# Patient Record
Sex: MALE | Race: WHITE | NOT HISPANIC OR LATINO
[De-identification: names, ages, dates, MRNs, and addresses within clinical notes are randomized per-mention and may not be internally consistent; named-entity substitution may affect disease eponyms.]

---

## 2018-09-02 ENCOUNTER — TRANSCRIPTION ENCOUNTER (OUTPATIENT)
Age: 63
End: 2018-09-02

## 2022-11-09 ENCOUNTER — APPOINTMENT (OUTPATIENT)
Dept: NEUROLOGY | Facility: CLINIC | Age: 67
End: 2022-11-09

## 2022-11-09 VITALS
BODY MASS INDEX: 25.11 KG/M2 | TEMPERATURE: 97.9 F | HEART RATE: 61 BPM | OXYGEN SATURATION: 98 % | DIASTOLIC BLOOD PRESSURE: 85 MMHG | SYSTOLIC BLOOD PRESSURE: 144 MMHG | WEIGHT: 160 LBS | HEIGHT: 67 IN

## 2022-11-09 PROCEDURE — 99205 OFFICE O/P NEW HI 60 MIN: CPT

## 2023-01-10 ENCOUNTER — APPOINTMENT (OUTPATIENT)
Dept: NUCLEAR MEDICINE | Facility: HOSPITAL | Age: 68
End: 2023-01-10

## 2023-01-31 ENCOUNTER — APPOINTMENT (OUTPATIENT)
Dept: NEUROLOGY | Facility: CLINIC | Age: 68
End: 2023-01-31
Payer: MEDICARE

## 2023-01-31 VITALS
OXYGEN SATURATION: 97 % | HEART RATE: 60 BPM | SYSTOLIC BLOOD PRESSURE: 130 MMHG | HEIGHT: 67 IN | DIASTOLIC BLOOD PRESSURE: 85 MMHG | BODY MASS INDEX: 24.8 KG/M2 | TEMPERATURE: 97.2 F | WEIGHT: 158 LBS

## 2023-01-31 VITALS — HEART RATE: 68 BPM | SYSTOLIC BLOOD PRESSURE: 129 MMHG | DIASTOLIC BLOOD PRESSURE: 78 MMHG

## 2023-01-31 PROCEDURE — 99215 OFFICE O/P EST HI 40 MIN: CPT

## 2023-03-07 ENCOUNTER — APPOINTMENT (OUTPATIENT)
Dept: NUCLEAR MEDICINE | Facility: HOSPITAL | Age: 68
End: 2023-03-07

## 2023-03-07 ENCOUNTER — OUTPATIENT (OUTPATIENT)
Dept: OUTPATIENT SERVICES | Facility: HOSPITAL | Age: 68
LOS: 1 days | End: 2023-03-07
Payer: MEDICARE

## 2023-03-07 PROCEDURE — A9584: CPT

## 2023-03-07 PROCEDURE — 78803 RP LOCLZJ TUM SPECT 1 AREA: CPT

## 2023-03-07 PROCEDURE — 78803 RP LOCLZJ TUM SPECT 1 AREA: CPT | Mod: 26,MH

## 2023-03-10 ENCOUNTER — APPOINTMENT (OUTPATIENT)
Dept: NEUROLOGY | Facility: CLINIC | Age: 68
End: 2023-03-10
Payer: MEDICARE

## 2023-03-10 VITALS — DIASTOLIC BLOOD PRESSURE: 83 MMHG | SYSTOLIC BLOOD PRESSURE: 147 MMHG | HEART RATE: 67 BPM

## 2023-03-10 VITALS
WEIGHT: 162 LBS | DIASTOLIC BLOOD PRESSURE: 86 MMHG | OXYGEN SATURATION: 98 % | SYSTOLIC BLOOD PRESSURE: 131 MMHG | BODY MASS INDEX: 25.43 KG/M2 | HEIGHT: 67 IN | HEART RATE: 58 BPM | TEMPERATURE: 97.4 F

## 2023-03-10 PROCEDURE — 99215 OFFICE O/P EST HI 40 MIN: CPT

## 2023-03-10 NOTE — HISTORY OF PRESENT ILLNESS
[FreeTextEntry1] : 67 year RH male patient who was diagnosed with Parkinson's Disease in early 2022. He first started noticing symptoms with a 6 month history of changes in handwriting. Pt had a brain MRI on 5/10/22 which revealed mild cerebral volume loss, and mild/moderate inflammatory changes in para nasal sinuses.\par \par TESSIE sacn showed abnormal scan with significantly severe Left more than right consistent with clinical diagnosis fo PD. \par \par Pt reports still functioning, still able to dress and balance is good.\par - Not feeling as strong as he was but still still running an employment agency, back slightly stiffer. \par - walking 45min/ day, strength and conditioning with a  3x/week. \par - sleep is poor. wakes up 1-2hrs after falling asleep. Doesn’t feel comfortable bed, difficult to turn in bed, watches [employer], eats an apple. Needs to be comfortable. less acting out dreams. occasional urination. \par - memory not sot as sharp as when was younger. \par \par Current Medications\par Melatonin 5 mg QHS\par Simvastatin 20 mg\par Ezetimibe 10m\par miralax

## 2023-03-10 NOTE — PHYSICAL EXAM
[FreeTextEntry1] : Patient with normal cognitive function, mild hypomimia with diminished blinking, mild reduction of voice volume but no significant dysarthria.\par There is mild rigidity with some cogwheeling, present bilaterally, more evident on the right side mild to moderate and mild on the left\par no resting tremor today\par There is mild loss of motor dexterity, with decrement at rapid sequential and rapid alternating movements, also slightly more evident on the right side.\par Foot tapping is slightly impaired, also with the right side more affected.\par Posture is mildly stooped, with no slight shortening of gait stride and mild asymmetry with left reduction, and decrease arm swing, more evident on the left side. No trouble standing from a seated position with arms crossed. No trouble with turns, no freezing of gait. \par Postural reflexes are normal.\par

## 2023-03-10 NOTE — ASSESSMENT
[FreeTextEntry1] : 67 year RH male patient who was diagnosed with Parkinson's Disease in early 2022. He first started noticing symptoms with a 6 month history of changes in handwriting. Pt had a brain MRI on 5/10/22 which revealed mild cerebral volume loss, and mild/moderate inflammatory changes in para nasal sinuses. TESSIE scan abnormal. \par \par Overall, no significant changes since the last visit. Feels slightly weaker specially at night with insomnia secondary to trouble finding a comfortable position in bed. He ambulates independently with no history of falls. He is exercising 3x / week with a  and walking 45min daily. RBD symptoms have improved with melatonin significantly. Constipation is improved with miralax. His exam appears stable with rigidity and bradykinesias R>L which is mostly mild. \par \par Plan:\par - Continue Melatonin OTC 5 mg QHS for RBD related symptoms. May increase to max of 10mg qhs. - In the future, consider Clonazepam if Melatonin does not improve sleep\par - Discussed the importance of exercise and walking daily to slow the progression of the disease\par - Start Azilect 0.5mg for 1 month and increase to 1mg qd in 1 month given some evidence of slowing down disease progression \par \par Encouraged to increase exercise and physical activity and maintain an active social and intellectual life.\par \par More than 50% of the visit were dedicated to review of treatment, therapeutic plan, and prognosis, and patient was counseled on coping and physical and emotional wellbeing.\par \par Case discussed and pt examined with movement attending Dr. Smith\par \par Juan Quinn MD\par Movement Fellow

## 2023-06-27 ENCOUNTER — APPOINTMENT (OUTPATIENT)
Dept: NEUROLOGY | Facility: CLINIC | Age: 68
End: 2023-06-27
Payer: MEDICARE

## 2023-06-27 VITALS
HEIGHT: 67 IN | WEIGHT: 157 LBS | HEART RATE: 70 BPM | TEMPERATURE: 97.9 F | OXYGEN SATURATION: 96 % | SYSTOLIC BLOOD PRESSURE: 129 MMHG | BODY MASS INDEX: 24.64 KG/M2 | DIASTOLIC BLOOD PRESSURE: 87 MMHG

## 2023-06-27 VITALS — HEART RATE: 74 BPM | SYSTOLIC BLOOD PRESSURE: 129 MMHG | DIASTOLIC BLOOD PRESSURE: 80 MMHG

## 2023-06-27 PROCEDURE — 99215 OFFICE O/P EST HI 40 MIN: CPT

## 2023-06-27 RX ORDER — RASAGILINE 0.5 MG/1
0.5 TABLET ORAL
Qty: 30 | Refills: 0 | Status: DISCONTINUED | COMMUNITY
Start: 2023-03-10 | End: 2023-06-27

## 2023-07-18 ENCOUNTER — APPOINTMENT (OUTPATIENT)
Dept: UROLOGY | Facility: CLINIC | Age: 68
End: 2023-07-18
Payer: MEDICARE

## 2023-07-18 VITALS
HEIGHT: 67 IN | SYSTOLIC BLOOD PRESSURE: 133 MMHG | OXYGEN SATURATION: 96 % | DIASTOLIC BLOOD PRESSURE: 83 MMHG | WEIGHT: 157 LBS | HEART RATE: 65 BPM | BODY MASS INDEX: 24.64 KG/M2

## 2023-07-18 DIAGNOSIS — Z78.9 OTHER SPECIFIED HEALTH STATUS: ICD-10-CM

## 2023-07-18 DIAGNOSIS — G20 PARKINSON'S DISEASE: ICD-10-CM

## 2023-07-18 DIAGNOSIS — N40.1 BENIGN PROSTATIC HYPERPLASIA WITH LOWER URINARY TRACT SYMPMS: ICD-10-CM

## 2023-07-18 DIAGNOSIS — N13.8 BENIGN PROSTATIC HYPERPLASIA WITH LOWER URINARY TRACT SYMPMS: ICD-10-CM

## 2023-07-18 DIAGNOSIS — Z00.00 ENCOUNTER FOR GENERAL ADULT MEDICAL EXAMINATION W/OUT ABNORMAL FINDINGS: ICD-10-CM

## 2023-07-18 PROCEDURE — 99204 OFFICE O/P NEW MOD 45 MIN: CPT

## 2023-07-18 NOTE — ASSESSMENT
[FreeTextEntry1] : Diagnosis\par BPH\par LUTS\par PSA screening\par Parkinson's\par \par Plan \par Terazosin trial \par PSA screening \par UCX\par Follow up in 2 months

## 2023-07-18 NOTE — HISTORY OF PRESENT ILLNESS
[FreeTextEntry1] : Patient is a 67 year old man with Parkinson's\par \par He does not sleep well at night. \par He has new onset of nocturia 3x/night \par He believes he has interrupted sleep from his Parkinson's; can fall out of bed, and acting out dreams\par He has daytime frequency, urgency \par No UUI \par Also stream is less strong and there is post void dribbling \par \par PVR today 4 cc \par \par Current Medications\par Azilect 1 mg\par Simvastatin 20 mg\par Ezetimibe 10 mg \par

## 2023-07-19 LAB — PSA SERPL-MCNC: 2.11 NG/ML

## 2023-07-20 LAB — BACTERIA UR CULT: NORMAL

## 2023-09-19 ENCOUNTER — APPOINTMENT (OUTPATIENT)
Dept: UROLOGY | Facility: CLINIC | Age: 68
End: 2023-09-19
Payer: MEDICARE

## 2023-09-19 VITALS
SYSTOLIC BLOOD PRESSURE: 133 MMHG | DIASTOLIC BLOOD PRESSURE: 84 MMHG | HEART RATE: 58 BPM | TEMPERATURE: 98.2 F | OXYGEN SATURATION: 98 %

## 2023-09-19 PROCEDURE — 99213 OFFICE O/P EST LOW 20 MIN: CPT

## 2023-09-20 ENCOUNTER — APPOINTMENT (OUTPATIENT)
Dept: NEUROLOGY | Facility: CLINIC | Age: 68
End: 2023-09-20
Payer: MEDICARE

## 2023-09-20 VITALS
HEIGHT: 67 IN | HEART RATE: 61 BPM | BODY MASS INDEX: 24.8 KG/M2 | SYSTOLIC BLOOD PRESSURE: 120 MMHG | TEMPERATURE: 97.2 F | WEIGHT: 158 LBS | OXYGEN SATURATION: 97 % | DIASTOLIC BLOOD PRESSURE: 78 MMHG

## 2023-09-20 VITALS — OXYGEN SATURATION: 97 % | HEART RATE: 62 BPM | SYSTOLIC BLOOD PRESSURE: 138 MMHG | DIASTOLIC BLOOD PRESSURE: 85 MMHG

## 2023-09-20 PROCEDURE — 99215 OFFICE O/P EST HI 40 MIN: CPT

## 2023-09-26 ENCOUNTER — TRANSCRIPTION ENCOUNTER (OUTPATIENT)
Age: 68
End: 2023-09-26

## 2023-09-26 RX ORDER — RASAGILINE 1 MG/1
1 TABLET ORAL
Qty: 90 | Refills: 3 | Status: ACTIVE | COMMUNITY
Start: 2023-03-10 | End: 1900-01-01

## 2023-10-09 ENCOUNTER — RX RENEWAL (OUTPATIENT)
Age: 68
End: 2023-10-09

## 2023-12-15 ENCOUNTER — APPOINTMENT (OUTPATIENT)
Dept: NEUROLOGY | Facility: CLINIC | Age: 68
End: 2023-12-15
Payer: MEDICARE

## 2023-12-15 VITALS
HEIGHT: 67 IN | BODY MASS INDEX: 24.64 KG/M2 | DIASTOLIC BLOOD PRESSURE: 78 MMHG | SYSTOLIC BLOOD PRESSURE: 133 MMHG | HEART RATE: 75 BPM | OXYGEN SATURATION: 98 % | WEIGHT: 157 LBS | TEMPERATURE: 97.8 F

## 2023-12-15 VITALS — HEART RATE: 84 BPM | SYSTOLIC BLOOD PRESSURE: 115 MMHG | DIASTOLIC BLOOD PRESSURE: 73 MMHG

## 2023-12-15 PROCEDURE — 99215 OFFICE O/P EST HI 40 MIN: CPT

## 2024-01-09 ENCOUNTER — RX RENEWAL (OUTPATIENT)
Age: 69
End: 2024-01-09

## 2024-01-11 ENCOUNTER — RX RENEWAL (OUTPATIENT)
Age: 69
End: 2024-01-11

## 2024-02-20 ENCOUNTER — APPOINTMENT (OUTPATIENT)
Dept: NEUROLOGY | Facility: CLINIC | Age: 69
End: 2024-02-20
Payer: MEDICARE

## 2024-02-20 PROCEDURE — 99442: CPT | Mod: 93

## 2024-03-13 ENCOUNTER — APPOINTMENT (OUTPATIENT)
Dept: NEUROLOGY | Facility: CLINIC | Age: 69
End: 2024-03-13
Payer: MEDICARE

## 2024-03-13 PROCEDURE — 99442: CPT | Mod: NC,93

## 2024-03-19 ENCOUNTER — APPOINTMENT (OUTPATIENT)
Dept: UROLOGY | Facility: CLINIC | Age: 69
End: 2024-03-19
Payer: MEDICARE

## 2024-03-19 VITALS
DIASTOLIC BLOOD PRESSURE: 81 MMHG | WEIGHT: 153 LBS | HEIGHT: 67 IN | OXYGEN SATURATION: 98 % | BODY MASS INDEX: 24.01 KG/M2 | SYSTOLIC BLOOD PRESSURE: 122 MMHG | HEART RATE: 68 BPM | TEMPERATURE: 97.2 F

## 2024-03-19 PROCEDURE — 99212 OFFICE O/P EST SF 10 MIN: CPT

## 2024-03-19 NOTE — HISTORY OF PRESENT ILLNESS
[FreeTextEntry1] : CC: BPH, nocturia   Patient doing well with terazosin  Nocturia down from q3-4 to q1-2  He is happy with that  PSA 7/2023 was 2.11 ng/ml

## 2024-03-19 NOTE — ASSESSMENT
[FreeTextEntry1] : Diagnosis: BPH, nocturia   Plan  Continue terazosin PSA annual    Wilfrido Ruth MD, FACS, FRCS  of Urology [facility] Director of Laparoscopic and Robotic Surgery [facility] Director of Urology, [facility] Professor of Urology   (Office) [phone] (Cell)  [phone] [email or web address]  The total amount of time I have personally spent preparing for this visit, reviewing the patient's test results, obtaining external history, ordering tests/medications, documenting clinical information, communicating with and counseling the patient/family and/or caregiver(s), and spent face to face with the patient explaining the above was minutes = 15

## 2024-03-25 ENCOUNTER — RX RENEWAL (OUTPATIENT)
Age: 69
End: 2024-03-25

## 2024-04-24 ENCOUNTER — APPOINTMENT (OUTPATIENT)
Dept: NEUROLOGY | Facility: CLINIC | Age: 69
End: 2024-04-24

## 2024-05-08 ENCOUNTER — APPOINTMENT (OUTPATIENT)
Dept: NEUROLOGY | Facility: CLINIC | Age: 69
End: 2024-05-08
Payer: MEDICARE

## 2024-05-08 ENCOUNTER — NON-APPOINTMENT (OUTPATIENT)
Age: 69
End: 2024-05-08

## 2024-05-08 VITALS
SYSTOLIC BLOOD PRESSURE: 125 MMHG | HEART RATE: 58 BPM | BODY MASS INDEX: 24.33 KG/M2 | OXYGEN SATURATION: 98 % | WEIGHT: 155 LBS | TEMPERATURE: 96.3 F | DIASTOLIC BLOOD PRESSURE: 79 MMHG | HEIGHT: 67 IN

## 2024-05-08 PROCEDURE — G2211 COMPLEX E/M VISIT ADD ON: CPT

## 2024-05-08 PROCEDURE — 99215 OFFICE O/P EST HI 40 MIN: CPT

## 2024-05-08 RX ORDER — ROPINIROLE 2 MG/1
2 TABLET, FILM COATED, EXTENDED RELEASE ORAL
Qty: 360 | Refills: 3 | Status: DISCONTINUED | COMMUNITY
Start: 2024-01-16 | End: 2024-05-08

## 2024-05-08 NOTE — PHYSICAL EXAM
[FreeTextEntry1] : Patient with normal cognitive function, mild hypomimia with diminished blinking, mild reduction of voice volume but no significant dysarthria. There is mild rigidity with some cogwheeling, present bilaterally, more evident on the left side. Mild intermittent resting tremor is present in the left hand, and occasionally in the right. There is mild loss of motor dexterity, with decrement at rapid sequential and rapid alternating movements, also slightly more evident on the left side. Foot tapping is slightly impaired, also with the left side more affected. Posture is mildly stooped, with some shortening of gait stride and mild asymmetry with left reduction, and decrease arm swing, more evident on the left side. Postural reflexes are normal.

## 2024-05-08 NOTE — HISTORY OF PRESENT ILLNESS
[FreeTextEntry1] : 68-year-old with Parkinson's disease, with initial symptoms in early 2022, TESSIE confirmed.  He was started on Ropinirole, with some difficulty increasing the dose to actual 8 mg daily. He reports that his gait may be better, is wife thinks it is better, still reports some tremor.  He walks daily, plays tennis once a week, plays tennis once a week, working less, mood is OK, he denies being depressed, but feeling he is "less patient",  Sleep is better, taking Clonazepam 0.5 mg qhs. Some fatigue, tiredness.  medications: Reequip ER 8 mg (2 x 4 mg).

## 2024-05-09 RX ORDER — CLONAZEPAM 0.5 MG/1
0.5 TABLET ORAL
Qty: 30 | Refills: 1 | Status: ACTIVE | COMMUNITY
Start: 2023-07-06 | End: 1900-01-01

## 2024-05-13 RX ORDER — ROPINIROLE 8 MG/1
8 TABLET, FILM COATED, EXTENDED RELEASE ORAL
Qty: 90 | Refills: 3 | Status: ACTIVE | COMMUNITY
Start: 1900-01-01 | End: 1900-01-01

## 2024-06-06 ENCOUNTER — APPOINTMENT (OUTPATIENT)
Dept: NEUROLOGY | Facility: CLINIC | Age: 69
End: 2024-06-06

## 2024-06-06 VITALS
HEART RATE: 59 BPM | BODY MASS INDEX: 24.48 KG/M2 | OXYGEN SATURATION: 96 % | DIASTOLIC BLOOD PRESSURE: 78 MMHG | HEIGHT: 67 IN | SYSTOLIC BLOOD PRESSURE: 131 MMHG | TEMPERATURE: 97 F | WEIGHT: 156 LBS

## 2024-06-07 NOTE — HISTORY OF PRESENT ILLNESS
[FreeTextEntry1] :  Thursday, 6-JUNE -2024 Study: ACTIVATE Study Visit: Part A genetic screening visit Patient:   Juan Snell Subject ID:[id number]  Patient's vitals appear.. I administered the Karly and Yahr scale, patient scored:2 Blood was drawn as part of study screening. Patient will continue study to next visit.  IP supply: N/A

## 2024-07-01 ENCOUNTER — RX RENEWAL (OUTPATIENT)
Age: 69
End: 2024-07-01

## 2024-08-22 ENCOUNTER — RX RENEWAL (OUTPATIENT)
Age: 69
End: 2024-08-22

## 2025-01-16 ENCOUNTER — APPOINTMENT (OUTPATIENT)
Dept: NEUROLOGY | Facility: CLINIC | Age: 70
End: 2025-01-16
Payer: MEDICARE

## 2025-01-16 ENCOUNTER — NON-APPOINTMENT (OUTPATIENT)
Age: 70
End: 2025-01-16

## 2025-01-16 VITALS — SYSTOLIC BLOOD PRESSURE: 125 MMHG | HEART RATE: 81 BPM | DIASTOLIC BLOOD PRESSURE: 77 MMHG | OXYGEN SATURATION: 96 %

## 2025-01-16 VITALS
DIASTOLIC BLOOD PRESSURE: 75 MMHG | SYSTOLIC BLOOD PRESSURE: 119 MMHG | OXYGEN SATURATION: 99 % | HEART RATE: 86 BPM | BODY MASS INDEX: 25 KG/M2 | WEIGHT: 159.6 LBS

## 2025-01-16 PROCEDURE — G2211 COMPLEX E/M VISIT ADD ON: CPT

## 2025-01-16 PROCEDURE — 99215 OFFICE O/P EST HI 40 MIN: CPT

## 2025-01-16 RX ORDER — SIMVASTATIN 20 MG/1
20 TABLET, FILM COATED ORAL DAILY
Refills: 0 | Status: ACTIVE | COMMUNITY

## 2025-01-16 RX ORDER — EZETIMIBE 10 MG/1
TABLET ORAL
Refills: 0 | Status: ACTIVE | COMMUNITY

## 2025-04-01 ENCOUNTER — APPOINTMENT (OUTPATIENT)
Facility: CLINIC | Age: 70
End: 2025-04-01
Payer: MEDICARE

## 2025-04-01 PROCEDURE — 99214 OFFICE O/P EST MOD 30 MIN: CPT | Mod: 2W

## 2025-04-15 ENCOUNTER — APPOINTMENT (OUTPATIENT)
Dept: UROLOGY | Facility: CLINIC | Age: 70
End: 2025-04-15
Payer: MEDICARE

## 2025-04-15 VITALS
SYSTOLIC BLOOD PRESSURE: 119 MMHG | HEART RATE: 75 BPM | WEIGHT: 159 LBS | OXYGEN SATURATION: 96 % | BODY MASS INDEX: 24.96 KG/M2 | TEMPERATURE: 98 F | HEIGHT: 67 IN | DIASTOLIC BLOOD PRESSURE: 75 MMHG

## 2025-04-15 PROCEDURE — 99213 OFFICE O/P EST LOW 20 MIN: CPT

## 2025-04-16 DIAGNOSIS — R97.20 ELEVATED PROSTATE, SPECIFIC ANTIGEN [PSA]: ICD-10-CM

## 2025-04-16 LAB — PSA SERPL-MCNC: 8.6 NG/ML

## 2025-04-22 LAB
APPEARANCE: CLEAR
BACTERIA: NEGATIVE /HPF
BILIRUBIN URINE: NEGATIVE
BLOOD URINE: ABNORMAL
CAST: 1 /LPF
COLOR: YELLOW
EPITHELIAL CELLS: 3 /HPF
GLUCOSE QUALITATIVE U: NEGATIVE MG/DL
KETONES URINE: ABNORMAL MG/DL
LEUKOCYTE ESTERASE URINE: ABNORMAL
MICROSCOPIC-UA: NORMAL
NITRITE URINE: NEGATIVE
PH URINE: 5.5
PROTEIN URINE: NORMAL MG/DL
PSA SERPL-MCNC: 7.76 NG/ML
RED BLOOD CELLS URINE: 3 /HPF
SPECIFIC GRAVITY URINE: 1.03
UROBILINOGEN URINE: 1 MG/DL
WHITE BLOOD CELLS URINE: 62 /HPF

## 2025-04-23 LAB — BACTERIA UR CULT: NORMAL

## 2025-04-30 ENCOUNTER — APPOINTMENT (OUTPATIENT)
Dept: MRI IMAGING | Facility: HOSPITAL | Age: 70
End: 2025-04-30

## 2025-04-30 ENCOUNTER — OUTPATIENT (OUTPATIENT)
Dept: OUTPATIENT SERVICES | Facility: HOSPITAL | Age: 70
LOS: 1 days | End: 2025-04-30
Payer: MEDICARE

## 2025-04-30 PROCEDURE — A9585: CPT

## 2025-04-30 PROCEDURE — 72197 MRI PELVIS W/O & W/DYE: CPT

## 2025-04-30 PROCEDURE — 72197 MRI PELVIS W/O & W/DYE: CPT | Mod: 26

## 2025-05-02 ENCOUNTER — NON-APPOINTMENT (OUTPATIENT)
Age: 70
End: 2025-05-02

## 2025-05-13 ENCOUNTER — APPOINTMENT (OUTPATIENT)
Dept: UROLOGY | Facility: CLINIC | Age: 70
End: 2025-05-13
Payer: MEDICARE

## 2025-05-13 VITALS
HEIGHT: 67 IN | WEIGHT: 159 LBS | SYSTOLIC BLOOD PRESSURE: 147 MMHG | BODY MASS INDEX: 24.96 KG/M2 | HEART RATE: 77 BPM | OXYGEN SATURATION: 97 % | TEMPERATURE: 97.7 F | DIASTOLIC BLOOD PRESSURE: 85 MMHG

## 2025-05-13 PROCEDURE — 99213 OFFICE O/P EST LOW 20 MIN: CPT

## 2025-07-17 ENCOUNTER — APPOINTMENT (OUTPATIENT)
Facility: CLINIC | Age: 70
End: 2025-07-17
Payer: MEDICARE

## 2025-07-17 VITALS
OXYGEN SATURATION: 97 % | WEIGHT: 153.9 LBS | SYSTOLIC BLOOD PRESSURE: 121 MMHG | HEART RATE: 59 BPM | DIASTOLIC BLOOD PRESSURE: 77 MMHG | BODY MASS INDEX: 24.1 KG/M2 | TEMPERATURE: 98.1 F

## 2025-07-17 VITALS — HEART RATE: 62 BPM | DIASTOLIC BLOOD PRESSURE: 79 MMHG | OXYGEN SATURATION: 96 % | SYSTOLIC BLOOD PRESSURE: 121 MMHG

## 2025-07-17 PROCEDURE — 99215 OFFICE O/P EST HI 40 MIN: CPT

## 2025-07-17 PROCEDURE — G2211 COMPLEX E/M VISIT ADD ON: CPT

## 2025-07-17 RX ORDER — ROPINIROLE 12 MG/1
12 TABLET, FILM COATED, EXTENDED RELEASE ORAL DAILY
Qty: 30 | Refills: 3 | Status: ACTIVE | COMMUNITY
Start: 2025-07-17 | End: 1900-01-01

## 2025-07-21 ENCOUNTER — RX RENEWAL (OUTPATIENT)
Age: 70
End: 2025-07-21

## 2025-07-31 ENCOUNTER — LABORATORY RESULT (OUTPATIENT)
Age: 70
End: 2025-07-31

## 2025-08-06 ENCOUNTER — NON-APPOINTMENT (OUTPATIENT)
Age: 70
End: 2025-08-06

## 2025-08-19 ENCOUNTER — APPOINTMENT (OUTPATIENT)
Dept: UROLOGY | Facility: CLINIC | Age: 70
End: 2025-08-19